# Patient Record
Sex: FEMALE | Race: AMERICAN INDIAN OR ALASKA NATIVE
[De-identification: names, ages, dates, MRNs, and addresses within clinical notes are randomized per-mention and may not be internally consistent; named-entity substitution may affect disease eponyms.]

---

## 2021-05-03 ENCOUNTER — HOSPITAL ENCOUNTER (EMERGENCY)
Dept: HOSPITAL 5 - ED | Age: 56
LOS: 1 days | Discharge: ANOTHER HOSPITAL | End: 2021-05-04
Payer: COMMERCIAL

## 2021-05-03 VITALS — DIASTOLIC BLOOD PRESSURE: 73 MMHG | SYSTOLIC BLOOD PRESSURE: 154 MMHG

## 2021-05-03 DIAGNOSIS — Z20.822: ICD-10-CM

## 2021-05-03 DIAGNOSIS — H53.9: ICD-10-CM

## 2021-05-03 DIAGNOSIS — E87.3: Primary | ICD-10-CM

## 2021-05-03 DIAGNOSIS — R94.31: ICD-10-CM

## 2021-05-03 DIAGNOSIS — Z88.8: ICD-10-CM

## 2021-05-03 DIAGNOSIS — E66.9: ICD-10-CM

## 2021-05-03 LAB
ALBUMIN SERPL-MCNC: 4.4 G/DL (ref 3.9–5)
ALT SERPL-CCNC: 14 UNITS/L (ref 7–56)
APTT BLD: 27.4 SEC. (ref 24.2–36.6)
BACTERIA #/AREA URNS HPF: (no result) /HPF
BASOPHILS # (AUTO): 0 K/MM3 (ref 0–0.1)
BASOPHILS NFR BLD AUTO: 0.4 % (ref 0–1.8)
BILIRUB UR QL STRIP: (no result)
BLOOD UR QL VISUAL: (no result)
BUN SERPL-MCNC: 20 MG/DL (ref 7–17)
BUN/CREAT SERPL: 20 %
CALCIUM SERPL-MCNC: 9.9 MG/DL (ref 8.4–10.2)
CRP SERPL-MCNC: 0.1 MG/DL (ref 0–1.3)
EOSINOPHIL # BLD AUTO: 0.1 K/MM3 (ref 0–0.4)
EOSINOPHIL NFR BLD AUTO: 0.8 % (ref 0–4.3)
HCO3 BLDA-SCNC: 19.6 MMOL/L (ref 20–26)
HCT VFR BLD CALC: 34.6 % (ref 30.3–42.9)
HEMOLYSIS INDEX: 16
HGB BLD-MCNC: 12.5 GM/DL (ref 10.1–14.3)
INR PPP: 1.07 (ref 0.87–1.13)
LYMPHOCYTES # BLD AUTO: 3.5 K/MM3 (ref 1.2–5.4)
LYMPHOCYTES NFR BLD AUTO: 43.1 % (ref 13.4–35)
MCHC RBC AUTO-ENTMCNC: 36 % (ref 30–34)
MCV RBC AUTO: 112 FL (ref 79–97)
MONOCYTES # (AUTO): 0.5 K/MM3 (ref 0–0.8)
MONOCYTES % (AUTO): 6.2 % (ref 0–7.3)
MUCOUS THREADS #/AREA URNS HPF: (no result) /HPF
PCO2 BLDA: 21.3 MM HG
PH BLDA: 7.58 PH UNITS (ref 7.35–7.45)
PH UR STRIP: 7 [PH] (ref 5–7)
PLATELET # BLD: 290 K/MM3 (ref 140–440)
PO2 BLDA: 138.2 MM HG (ref 80–90)
PROT UR STRIP-MCNC: (no result) MG/DL
RBC # BLD AUTO: 3.1 M/MM3 (ref 3.65–5.03)
RBC #/AREA URNS HPF: 2 /HPF (ref 0–6)
UROBILINOGEN UR-MCNC: 2 MG/DL (ref ?–2)
WBC #/AREA URNS HPF: 1 /HPF (ref 0–6)

## 2021-05-03 PROCEDURE — 96374 THER/PROPH/DIAG INJ IV PUSH: CPT

## 2021-05-03 PROCEDURE — 71275 CT ANGIOGRAPHY CHEST: CPT

## 2021-05-03 PROCEDURE — 87040 BLOOD CULTURE FOR BACTERIA: CPT

## 2021-05-03 PROCEDURE — 80053 COMPREHEN METABOLIC PANEL: CPT

## 2021-05-03 PROCEDURE — 96375 TX/PRO/DX INJ NEW DRUG ADDON: CPT

## 2021-05-03 PROCEDURE — 84484 ASSAY OF TROPONIN QUANT: CPT

## 2021-05-03 PROCEDURE — 82550 ASSAY OF CK (CPK): CPT

## 2021-05-03 PROCEDURE — 85670 THROMBIN TIME PLASMA: CPT

## 2021-05-03 PROCEDURE — 84145 PROCALCITONIN (PCT): CPT

## 2021-05-03 PROCEDURE — 83615 LACTATE (LD) (LDH) ENZYME: CPT

## 2021-05-03 PROCEDURE — 36415 COLL VENOUS BLD VENIPUNCTURE: CPT

## 2021-05-03 PROCEDURE — 80320 DRUG SCREEN QUANTALCOHOLS: CPT

## 2021-05-03 PROCEDURE — 82140 ASSAY OF AMMONIA: CPT

## 2021-05-03 PROCEDURE — 83880 ASSAY OF NATRIURETIC PEPTIDE: CPT

## 2021-05-03 PROCEDURE — 70450 CT HEAD/BRAIN W/O DYE: CPT

## 2021-05-03 PROCEDURE — 82728 ASSAY OF FERRITIN: CPT

## 2021-05-03 PROCEDURE — 71045 X-RAY EXAM CHEST 1 VIEW: CPT

## 2021-05-03 PROCEDURE — 85025 COMPLETE CBC W/AUTO DIFF WBC: CPT

## 2021-05-03 PROCEDURE — 93005 ELECTROCARDIOGRAM TRACING: CPT

## 2021-05-03 PROCEDURE — 82553 CREATINE MB FRACTION: CPT

## 2021-05-03 PROCEDURE — 81001 URINALYSIS AUTO W/SCOPE: CPT

## 2021-05-03 PROCEDURE — 85730 THROMBOPLASTIN TIME PARTIAL: CPT

## 2021-05-03 PROCEDURE — G0480 DRUG TEST DEF 1-7 CLASSES: HCPCS

## 2021-05-03 PROCEDURE — 82805 BLOOD GASES W/O2 SATURATION: CPT

## 2021-05-03 PROCEDURE — 85610 PROTHROMBIN TIME: CPT

## 2021-05-03 PROCEDURE — 83735 ASSAY OF MAGNESIUM: CPT

## 2021-05-03 PROCEDURE — 85379 FIBRIN DEGRADATION QUANT: CPT

## 2021-05-03 PROCEDURE — 86140 C-REACTIVE PROTEIN: CPT

## 2021-05-03 PROCEDURE — 82803 BLOOD GASES ANY COMBINATION: CPT

## 2021-05-03 PROCEDURE — 99285 EMERGENCY DEPT VISIT HI MDM: CPT

## 2021-05-03 NOTE — CAT SCAN REPORT
CTA CHEST WITH CONTRAST



INDICATION / CLINICAL INFORMATION: MAIN.



TECHNIQUE: Axial CT images were obtained through the chest after injection of 100 cc of Omnipaque 350
 IV contrast. 3 plane MIP and/or 3D reconstructions were produced. All CT scans at this location are 
performed using CT dose reduction for ALARA by means of automated exposure control. 



COMPARISON: None available.



FINDINGS:

PULMONARY ARTERIES: No pulmonary emboli.

THORACIC AORTA: No significant abnormality. 

HEART: No significant abnormality.

CORONARY ARTERY CALCIFICATION: None.

MEDIASTINUM / ESTHELA: No significant abnormality.

PLEURA: No pleural effusion. No pneumothorax.

LUNGS: No acute air space or interstitial disease. 



ADDITIONAL FINDINGS: None.



UPPER ABDOMEN: There is cholelithiasis.



SKELETAL STRUCTURES: No significant osseous abnormality.



IMPRESSION:

1. No CT evidence for pulmonary embolism. 

2. Cholelithiasis



Signer Name: Johnny Bailey MD 

Signed: 5/3/2021 3:01 PM

Workstation Name: [email or web address]

## 2021-05-03 NOTE — CAT SCAN REPORT
CT head/brain wo con



INDICATION:

Weakness, blurry vision.



TECHNIQUE: Routine CT head without contrast. All CT scans at this location are performed using CT dos
e reduction for ALARA by means of automated exposure control.



COMPARISON: 

None.



FINDINGS:



BRAIN / INTRACRANIAL CONTENTS: No acute hemorrhage, mass effect, midline shift, or hydrocephalus. No 
appreciable acute large territorial or lacunar infarct. No chronic infarct or focal atrophy. Normal b
rain volume and ventricular/sulcal size for age.



ORBITS: No significant abnormality of visualized orbits.

SINUSES / MASTOIDS: No significant abnormality of visualized sinuses and mastoid air cells.



ADDITIONAL FINDINGS: None. 



IMPRESSION:

1. No acute intracranial abnormality. 



Signer Name: Alejandro Tate MD 

Signed: 5/3/2021 2:50 PM

Workstation Name: [email or web address]

## 2021-05-03 NOTE — XRAY REPORT
CHEST 1 VIEW 5/3/2021 11:48 AM



INDICATION / CLINICAL INFORMATION: Shortness of breath and weakness.



COMPARISON: None available.



FINDINGS:



SUPPORT DEVICES: None.



HEART / MEDIASTINUM: No significant abnormality. 



LUNGS / PLEURA: No significant pulmonary or pleural abnormality. No pneumothorax. 



ADDITIONAL FINDINGS: No significant additional findings.



IMPRESSION:

1. No acute findings.



Signer Name: Alejandro Tate MD 

Signed: 5/3/2021 1:01 PM

Workstation Name: [email or web address]

## 2021-05-03 NOTE — EMERGENCY DEPARTMENT REPORT
ED General Adult HPI





- General


Chief complaint: Weakness


Stated complaint: weak


PUI?: Yes


Time Seen by Provider: 21 11:37


Source: patient, EMS (Verbal report received from emergency medical services.  

EMS documentation not available at time of chart dictation ), RN notes reviewed


Mode of arrival: Stretcher


Limitations: No Limitations





- History of Present Illness


Initial comments: 





The patient was evaluated in the emergency department for symptoms described in 

the history of present illness.  He/she was evaluated in the context of the 

global COVID-19 pandemic, which necessitated consideration that the patient 

might be at risk for infection with the virus that causes COVID-19.  

Institutional protocols and algorithms that pertain to the evaluation of 

patients at risk for COVID-19 are in a state of rapid change based on 

information released by regulatory bodies including the CDC and federal and 

state organizations.  These policies and algorithms were followed during the 

patient's care in the emergency department.  Please note that these policies, 

procedures and recommendations changed on a rapid basis.





Primary CARE doctor: Nora





Past medical history: Hypertension, obesity, obstructive sleep apnea, noncompli

ant with CPAP, not currently on home oxygen, reports having had Covid 

vaccination x2, questionable normal pressure hydrocephalus.





During entire history and physical examination, I had on complete personal 

protective equipment.





This is a 55-year-old female.  She is not known to myself previously.  She is 

brought to the hospital by emergency medical services.  The patient works in a 

local cafeteria.  The patient reports that her coworkers contacted 911 "because 

I looked weak."  The patient complains of generalized weakness.  She denies 

physical pain.  She states she has developed shortness of breath since walking 

out to the EMS ambulance.





The patient denies headache, neck pain, chest pain, abdominal pain, hematemesis,

bright red blood per rectum.  She reports that she had "blurry vision", for "a 

few seconds", at around 7:00 AM, "when all of my symptoms started."  However, 

she believes that her vision is back to baseline.





The patient denies loss of taste and smell.  She denies urinary symptoms.  She 

denies hematemesis and bright red blood per rectum.





The patient states she feels generally weak, and short of breath.  She denies 

DVT and pulmonary embolism risk factors.








-: Gradual


Consistency: constant


Improves with: rest


Worsens with: movement





- Related Data


                                    Allergies











Allergy/AdvReac Type Severity Reaction Status Date / Time


 


acetaminophen [From Midrin] Allergy Severe Hives Verified 21 12:45


 


dichloralphenazone Allergy Severe Hives Verified 21 12:45





[From Midrin]     


 


isometheptene [From Midrin] Allergy Severe Hives Verified 21 12:45














ED Review of Systems


ROS: 


Stated complaint: POSSIBLE STROKE


Other details as noted in HPI





Constitutional: malaise, weakness.  denies: fever


Eyes: vision change.  denies: eye discharge


ENT: denies: epistaxis


Respiratory: shortness of breath


Cardiovascular: denies: chest pain


Gastrointestinal: denies: abdominal pain, hematemesis, melena, hematochezia


Genitourinary: denies: dysuria


Musculoskeletal: denies: back pain


Neurological: weakness.  denies: headache


Hematological/Lymphatic: denies: easy bleeding





ED Physical Exam





- General


Limitations: No Limitations


General appearance: alert, in no apparent distress, obese





- Head


Head exam: Present: atraumatic, normocephalic





- Eye


Eye exam: Present: normal appearance, PERRL, EOMI, other (Visual acuity intact 

to finger counting, color perception, reading at a close distance).  Absent: 

nystagmus





- ENT


ENT exam: Present: normal exam, normal orophraynx, mucous membranes moist, 

normal external ear exam





- Neck


Neck exam: Present: normal inspection, full ROM.  Absent: tenderness, menin

gismus





- Respiratory


Respiratory exam: Present: other (Pulmonary auscultation not performed secondary

to lack of disposable stethoscope, and PPE conservation strategy).  Absent: 

stridor





- Cardiovascular


Cardiovascular Exam: Present: other (Cardiac auscultation not performed 

secondary to lack of disposable stethoscope, and PPE conservation strategy)





- GI/Abdominal


GI/Abdominal exam: Present: soft.  Absent: distended, tenderness, guarding, 

rebound, rigid, pulsatile mass





- Extremities Exam


Extremities exam: Present: normal inspection, full ROM, other (2+ pulses noted 

in the bilateral upper and lower extremities.  There is no palpable cord.   

negative Homans sign.  Muscular compartments are soft.  The pelvis is stable.). 

Absent: pedal edema, calf tenderness





- Back Exam


Back exam: Present: normal inspection, full ROM.  Absent: tenderness, CVA 

tenderness (R), CVA tenderness (L), paraspinal tenderness, vertebral tenderness





- Neurological Exam


Neurological exam: Present: alert, oriented X3, other (No facial droop.  Tongue 

midline.  Extraocular movements intact bilaterally.  Facial sensation intact to 

light touch in V1, V2, V3 distribution bilaterally.  5 and a 5 strength in 4 

extremities.  Sensation intact to light touch in 4 extremities.)





- Psychiatric


Psychiatric exam: Present: anxious





- Skin


Skin exam: Present: warm, dry, intact, normal color.  Absent: rash





ED Course


                                   Vital Signs











  21





  11:35 12:27 12:30


 


Temperature 98.2 F  


 


Pulse Rate 71 72 72


 


Respiratory 13 14 10 L





Rate   


 


Blood Pressure 156/89  156/89


 


Blood Pressure   





[Left]   


 


O2 Sat by Pulse 100 100 100





Oximetry   














  21





  12:45 13:01 13:31


 


Temperature   


 


Pulse Rate 74 80 72


 


Respiratory 15 12 12





Rate   


 


Blood Pressure 156/89 156/89 156/89


 


Blood Pressure   





[Left]   


 


O2 Sat by Pulse 100 100 100





Oximetry   














  21





  14:16 14:32 16:01


 


Temperature   


 


Pulse Rate   74


 


Respiratory  12 11 L





Rate   


 


Blood Pressure 156/89  151/93


 


Blood Pressure  151/93 





[Left]   


 


O2 Sat by Pulse 100 100 





Oximetry   














  21





  16:56 17:00


 


Temperature  


 


Pulse Rate  64


 


Respiratory  10 L





Rate  


 


Blood Pressure 151/93 165/95


 


Blood Pressure  





[Left]  


 


O2 Sat by Pulse 100 100





Oximetry  














- Reevaluation(s)


Reevaluation #1: 





21 12:01


Differential diagnosis, including but not limited to: Hypoxemia, hypercarbia, 

pneumonia, pulmonary embolism, COVID-19, normal pressure hydrocephalus, in

tracranial lesion, acute coronary syndrome, electrolyte derangement, dehydration





Assessment and plan: 55-year-old female with a complaint of generalized 

weakness, transient visual change, not sure if monocular or binocular, but 

subjectively feels that vision back to baseline, without chest pain, DVT or 

pulmonary embolism risk factors, who reports noncompliance with CPAP, having had

 2 COVID-19 vaccinations, generalized malaise and fatigue, without headache or 

neck pain.





Place patient on isolation.  Obtain CT scan of the brain, x-ray of the chest, 

plus minus CT angiogram of the chest, depending on D-dimer, and x-ray results.  

Obtain arterial blood gas on room air, administer supplemental oxygen if 

necessary, and appropriate laboratory studies.





EKG abnormal without prior for comparison, but not consistent with STEMI.  

Reassess after initial data points.  Anticipate admission.  Have discussed this 

plan of care with the patient, who verbalized understanding.








Reevaluation #2: 





21 14:57


Please note that fever, tachycardia, hypotension documented by nursing team in 

error.  As per nursing report, this patient has not spiked a fever or become 

tachycardic.


21 16:30


Have discussed the case with [facility] physician, Dr. Trammell, with abnormal EKG, 

nonspecific symptoms, moderate risk for major adverse cardiac event as per heart

 score, we would recommend admission for diagnostic evaluation, and supportive 

care.





Nursing team informed me that the patient ambulated with a steady gait, but was 

very short of breath while ambulating.





Arterial blood gas demonstrates an essentially uncompensated respiratory 

alkalosis.


21 17:55


Awaiting callback and bed assignment from [facility].  Upon our last discussion, 

they informed us that they are currently waiting to hear back from [facility] 

regarding bed availability.


Reevaluation #3: 





21 18:57


Called [facility] with request for update.  Have discussed with their physician 

coordinator, Dr Burroughs.  She advises me that it is unlikely that [facility] 

has any beds and would not be able to accommodate with an admission.  She 

believes at [facility] may have beds, and she is waiting to hear back 

from them definitively.





Have discussed this patient's life of stable here in the emergency room.


Reevaluation #4: 





21 19:33


Patient updated on plan of care.  She is amenable to this plan of care.  We are 

awaiting bed assignment from [facility].  She is asking for headache medicine,

 anxiety medicine, and is asking to eat.  Orders initiated.  Patient may eat.





Care be transferred to the oncoming ER physician, to follow-up on [facility] bed ass

ignment.





ED Medical Decision Making





- Lab Data


Result diagrams: 


                                 21 12:30





                                 21 12:30








                                   Lab Results











  21 Range/Units





  12:15 12:30 12:30 


 


WBC    8.1  (4.5-11.0)  K/mm3


 


RBC    3.10 L  (3.65-5.03)  M/mm3


 


Hgb    12.5  (10.1-14.3)  gm/dl


 


Hct    34.6  (30.3-42.9)  %


 


MCV    112 H  (79-97)  fl


 


MCH    40 H  (28-32)  pg


 


MCHC    36 H  (30-34)  %


 


RDW    13.9  (13.2-15.2)  %


 


Plt Count    290  (140-440)  K/mm3


 


Lymph % (Auto)    43.1 H  (13.4-35.0)  %


 


Mono % (Auto)    6.2  (0.0-7.3)  %


 


Eos % (Auto)    0.8  (0.0-4.3)  %


 


Baso % (Auto)    0.4  (0.0-1.8)  %


 


Lymph # (Auto)    3.5  (1.2-5.4)  K/mm3


 


Mono # (Auto)    0.5  (0.0-0.8)  K/mm3


 


Eos # (Auto)    0.1  (0.0-0.4)  K/mm3


 


Baso # (Auto)    0.0  (0.0-0.1)  K/mm3


 


Seg Neutrophils %    49.5  (40.0-70.0)  %


 


Seg Neutrophils #    4.0  (1.8-7.7)  K/mm3


 


PT     (12.2-14.9)  Sec.


 


INR     (0.87-1.13)  


 


APTT     (24.2-36.6)  Sec.


 


Thrombin Time     (15.1-19.6)  Sec.


 


D-Dimer     (0-234)  ng/mlDDU


 


ABG pH  7.613 H    (7.320-7.450)  


 


POC ABG pCO2  20.2 L    (32.0-48.0)  mmHg


 


ABG pCO2     mm Hg


 


ABG pO2     (80.0-90.0)  mm Hg


 


POC ABG HCO3  20    


 


ABG HCO3     (20.0-26.0)  mmol/L


 


ABG O2 Saturation  98.9    (0-100)  


 


ABG O2 Content     (0.0-44)  


 


POC ABG Base Excess  0.6    


 


ABG Base Excess     (-2.0-3.0)  mmol/L


 


ABG Hemoglobin  12.7    (12.0-17.5)  


 


ABG Oxyhemoglobin  98.1 H    (94-98)  


 


ABG Carboxyhemoglobin     (0.0-5.0)  %


 


ABG Methemoglobin  0.3    (0.0-1.5)  


 


ABG Sodium  137.5    (136.0-145.0)  mmol/L


 


ABG Potassium  3.3 L    (3.40-4.50)  mmol/L


 


ABG Chloride  103.0    ()  mmol/L


 


ABG Glucose  105 H    (65-95)  mg/dL


 


Oxyhemoglobin     (95.0-99.0)  %


 


Carboxyhemoglobin  0.5    (0.5-1.5)  


 


FiO2     %


 


FiO2 %  21    


 


Lactic Acid     (0.7-2.0)  mmol/L


 


CK-MB (CK-2)     (0.0-4.0)  ng/mL


 


NT-Pro-B Natriuret Pep     (0-900)  pg/mL


 


Arterial Blood Glucose  105 H    (65-95)  mg/dL


 


Arterial Blood Ionized Calcium  4.7    (4.6-5.3)  mg/dL


 


Urine Color   Yellow   (Yellow)  


 


Urine Turbidity   Clear   (Clear)  


 


Urine pH   7.0   (5.0-7.0)  


 


Ur Specific Gravity   1.006   (1.003-1.030)  


 


Urine Protein   <15 mg/dl   (Negative)  mg/dL


 


Urine Glucose (UA)   Neg   (Negative)  mg/dL


 


Urine Ketones   20   (Negative)  mg/dL


 


Urine Blood   Neg   (Negative)  


 


Urine Nitrite   Neg   (Negative)  


 


Urine Bilirubin   Neg   (Negative)  


 


Urine Urobilinogen   2.0   (<2.0)  mg/dL


 


Ur Leukocyte Esterase   Neg   (Negative)  


 


Urine WBC (Auto)   1.0   (0.0-6.0)  /HPF


 


Urine RBC (Auto)   2.0   (0.0-6.0)  /HPF


 


U Epithel Cells (Auto)   1.0   (0-13.0)  /HPF


 


Urine Bacteria (Auto)   1+   (Negative)  /HPF


 


Urine Mucus   Few   /HPF


 


Plasma/Serum Alcohol     (0-0.07)  %














  21 Range/Units





  12:30 12:30 12:30 


 


WBC     (4.5-11.0)  K/mm3


 


RBC     (3.65-5.03)  M/mm3


 


Hgb     (10.1-14.3)  gm/dl


 


Hct     (30.3-42.9)  %


 


MCV     (79-97)  fl


 


MCH     (28-32)  pg


 


MCHC     (30-34)  %


 


RDW     (13.2-15.2)  %


 


Plt Count     (140-440)  K/mm3


 


Lymph % (Auto)     (13.4-35.0)  %


 


Mono % (Auto)     (0.0-7.3)  %


 


Eos % (Auto)     (0.0-4.3)  %


 


Baso % (Auto)     (0.0-1.8)  %


 


Lymph # (Auto)     (1.2-5.4)  K/mm3


 


Mono # (Auto)     (0.0-0.8)  K/mm3


 


Eos # (Auto)     (0.0-0.4)  K/mm3


 


Baso # (Auto)     (0.0-0.1)  K/mm3


 


Seg Neutrophils %     (40.0-70.0)  %


 


Seg Neutrophils #     (1.8-7.7)  K/mm3


 


PT  13.8    (12.2-14.9)  Sec.


 


INR  1.07    (0.87-1.13)  


 


APTT  27.4    (24.2-36.6)  Sec.


 


Thrombin Time  16.9    (15.1-19.6)  Sec.


 


D-Dimer  < 135.00    (0-234)  ng/mlDDU


 


ABG pH     (7.320-7.450)  


 


POC ABG pCO2     (32.0-48.0)  mmHg


 


ABG pCO2     mm Hg


 


ABG pO2     (80.0-90.0)  mm Hg


 


POC ABG HCO3     


 


ABG HCO3     (20.0-26.0)  mmol/L


 


ABG O2 Saturation     (0-100)  


 


ABG O2 Content     (0.0-44)  


 


POC ABG Base Excess     


 


ABG Base Excess     (-2.0-3.0)  mmol/L


 


ABG Hemoglobin     (12.0-17.5)  


 


ABG Oxyhemoglobin     (94-98)  


 


ABG Carboxyhemoglobin     (0.0-5.0)  %


 


ABG Methemoglobin     (0.0-1.5)  


 


ABG Sodium     (136.0-145.0)  mmol/L


 


ABG Potassium     (3.40-4.50)  mmol/L


 


ABG Chloride     ()  mmol/L


 


ABG Glucose     (65-95)  mg/dL


 


Oxyhemoglobin     (95.0-99.0)  %


 


Carboxyhemoglobin     (0.5-1.5)  


 


FiO2     %


 


FiO2 %     


 


Lactic Acid    2.40 H*  (0.7-2.0)  mmol/L


 


CK-MB (CK-2)   1.5   (0.0-4.0)  ng/mL


 


NT-Pro-B Natriuret Pep     (0-900)  pg/mL


 


Arterial Blood Glucose     (65-95)  mg/dL


 


Arterial Blood Ionized Calcium     (4.6-5.3)  mg/dL


 


Urine Color     (Yellow)  


 


Urine Turbidity     (Clear)  


 


Urine pH     (5.0-7.0)  


 


Ur Specific Gravity     (1.003-1.030)  


 


Urine Protein     (Negative)  mg/dL


 


Urine Glucose (UA)     (Negative)  mg/dL


 


Urine Ketones     (Negative)  mg/dL


 


Urine Blood     (Negative)  


 


Urine Nitrite     (Negative)  


 


Urine Bilirubin     (Negative)  


 


Urine Urobilinogen     (<2.0)  mg/dL


 


Ur Leukocyte Esterase     (Negative)  


 


Urine WBC (Auto)     (0.0-6.0)  /HPF


 


Urine RBC (Auto)     (0.0-6.0)  /HPF


 


U Epithel Cells (Auto)     (0-13.0)  /HPF


 


Urine Bacteria (Auto)     (Negative)  /HPF


 


Urine Mucus     /HPF


 


Plasma/Serum Alcohol     (0-0.07)  %














  21 Range/Units





  12:30 12:30 12:30 


 


WBC     (4.5-11.0)  K/mm3


 


RBC     (3.65-5.03)  M/mm3


 


Hgb     (10.1-14.3)  gm/dl


 


Hct     (30.3-42.9)  %


 


MCV     (79-97)  fl


 


MCH     (28-32)  pg


 


MCHC     (30-34)  %


 


RDW     (13.2-15.2)  %


 


Plt Count     (140-440)  K/mm3


 


Lymph % (Auto)     (13.4-35.0)  %


 


Mono % (Auto)     (0.0-7.3)  %


 


Eos % (Auto)     (0.0-4.3)  %


 


Baso % (Auto)     (0.0-1.8)  %


 


Lymph # (Auto)     (1.2-5.4)  K/mm3


 


Mono # (Auto)     (0.0-0.8)  K/mm3


 


Eos # (Auto)     (0.0-0.4)  K/mm3


 


Baso # (Auto)     (0.0-0.1)  K/mm3


 


Seg Neutrophils %     (40.0-70.0)  %


 


Seg Neutrophils #     (1.8-7.7)  K/mm3


 


PT     (12.2-14.9)  Sec.


 


INR     (0.87-1.13)  


 


APTT     (24.2-36.6)  Sec.


 


Thrombin Time     (15.1-19.6)  Sec.


 


D-Dimer     (0-234)  ng/mlDDU


 


ABG pH    7.581 H  (7.320-7.450)  


 


POC ABG pCO2     (32.0-48.0)  mmHg


 


ABG pCO2    21.3  mm Hg


 


ABG pO2    138.2 H  (80.0-90.0)  mm Hg


 


POC ABG HCO3     


 


ABG HCO3    19.6 L  (20.0-26.0)  mmol/L


 


ABG O2 Saturation    99.0  (0-100)  


 


ABG O2 Content    17.2  (0.0-44)  


 


POC ABG Base Excess     


 


ABG Base Excess    -0.5  (-2.0-3.0)  mmol/L


 


ABG Hemoglobin    12.4  (12.0-17.5)  


 


ABG Oxyhemoglobin     (94-98)  


 


ABG Carboxyhemoglobin    1.2  (0.0-5.0)  %


 


ABG Methemoglobin    0.4  (0.0-1.5)  


 


ABG Sodium     (136.0-145.0)  mmol/L


 


ABG Potassium     (3.40-4.50)  mmol/L


 


ABG Chloride     ()  mmol/L


 


ABG Glucose     (65-95)  mg/dL


 


Oxyhemoglobin    97.4  (95.0-99.0)  %


 


Carboxyhemoglobin     (0.5-1.5)  


 


FiO2    21  %


 


FiO2 %     


 


Lactic Acid     (0.7-2.0)  mmol/L


 


CK-MB (CK-2)     (0.0-4.0)  ng/mL


 


NT-Pro-B Natriuret Pep   75.31   (0-900)  pg/mL


 


Arterial Blood Glucose     (65-95)  mg/dL


 


Arterial Blood Ionized Calcium     (4.6-5.3)  mg/dL


 


Urine Color     (Yellow)  


 


Urine Turbidity     (Clear)  


 


Urine pH     (5.0-7.0)  


 


Ur Specific Gravity     (1.003-1.030)  


 


Urine Protein     (Negative)  mg/dL


 


Urine Glucose (UA)     (Negative)  mg/dL


 


Urine Ketones     (Negative)  mg/dL


 


Urine Blood     (Negative)  


 


Urine Nitrite     (Negative)  


 


Urine Bilirubin     (Negative)  


 


Urine Urobilinogen     (<2.0)  mg/dL


 


Ur Leukocyte Esterase     (Negative)  


 


Urine WBC (Auto)     (0.0-6.0)  /HPF


 


Urine RBC (Auto)     (0.0-6.0)  /HPF


 


U Epithel Cells (Auto)     (0-13.0)  /HPF


 


Urine Bacteria (Auto)     (Negative)  /HPF


 


Urine Mucus     /HPF


 


Plasma/Serum Alcohol  < 0.01    (0-0.07)  %











                                   Vital Signs











  21





  11:35 12:27 12:30


 


Temperature 98.2 F  


 


Pulse Rate 71 72 72


 


Respiratory 13 14 10 L





Rate   


 


Blood Pressure 156/89  156/89


 


O2 Sat by Pulse 100 100 100





Oximetry   














  21





  12:45


 


Temperature 


 


Pulse Rate 74


 


Respiratory 15





Rate 


 


Blood Pressure 156/89


 


O2 Sat by Pulse 100





Oximetry 














- EKG Data


-: EKG Interpreted by Me


EKG shows normal: sinus rhythm


Rate: normal





- EKG Data


When compared to previous EKG there are: previous EKG unavailable





21 12:03


EKG interpreted at 11: 35





Sinus rhythm, 76 bpm.  Normal axis, left ventricular hypertrophy, QTC prolonged,

 poor R wave progression.  This is an abnormal EKG.  This is not a STEMI.  There

 is no prior EKG available for comparison.











- Radiology Data


Radiology results: pending, report reviewed, image reviewed





[facility] [address] [place] [address] 

XRay Report Signed Patient: THIAGO MOSQUEDA MR#: [id number] 6 : 1965 

Acct:[id number] Age/Sex: 55 / F ADM Date: 21 Loc: ED Attending Dr: 

Ordering Physician: MONIE BRAND MD Date of Service: 21 Procedure(s):

 XR chest 1V ap Accession Number(s): [id number] cc: MONIE BRAND MD Fluoro 

Time In Minutes: CHEST 1 VIEW 5/3/2021 11:48 AM INDICATION / CLINICAL 

INFORMATION: Shortness of breath and weakness. COMPARISON: None available. 

FINDINGS: SUPPORT DEVICES: None. HEART / MEDIASTINUM: No significant 

abnormality. LUNGS / PLEURA: No significant pulmonary or pleural abnormality. No

 pneumothorax. ADDITIONAL FINDINGS: No significant additional findings. 

IMPRESSION: 1. No acute findings. Signer Name: Alejandro Tate MD Signed: 5/3/2021 

1:01 PM Workstation Name: [facility]"[employer]"[place] [id number] Transcribed By: DAVID Dictated By: Alejandro Tate MD Electronically Authenticated By: Alejandro Tate MD Signed 

Date/Time: 21 130 DD/DT: 21 1301 








CT head/brain wo con  INDICATION: Weakness, blurry vision.  TECHNIQUE: Routine 

CT head without contrast. All CT scans at this location are performed using CT 

dose reduction for ALARA by means of automated exposure control.  COMPARISON: 

None.  FINDINGS:  BRAIN / INTRACRANIAL CONTENTS: No acute hemorrhage, mass 

effect, midline shift, or hydrocephalus. No appreciable acute large territorial 

or lacunar infarct. No chronic infarct or focal atrophy. Normal brain volume and

 ventricular/sulcal size for age.  ORBITS: No significant abnormality of 

visualized orbits. SINUSES / MASTOIDS: No significant abnormality of visualized 

sinuses and mastoid air cells.  ADDITIONAL FINDINGS: None.  








IMPRESSION: 1. No acute intracranial abnormality.  Signer Name: Alejandro Tate MD 

Signed: 5/3/2021 1:50 PM Workstation Name: [facility] [address] [place] [id number] Cat

 Scan Report Signed Patient: THIAGO MOSQUEDA MR#: [id number] 6 : 1965 

Acct:[id number] Age/Sex: 55 / F ADM Date: 21 Loc: ED Attending Dr: 

Ordering Physician: MONIE BRAND MD Date of Service: 21 Procedure(s):

 CT angio chest Accession Number(s): [id number] cc: MONIE BRAND MD CTA CHEST 

WITH CONTRAST INDICATION / CLINICAL INFORMATION: MAIN. TECHNIQUE: Axial CT 

images were obtained through the chest after injection of 100 cc of Omnipaque 

350 IV contrast. 3 plane MIP and/or 3D reconstructions were produced. All CT 

scans at this location are performed using CT dose reduction for ALARA by means 

of automated exposure control. COMPARISON: None available. FINDINGS: PULMONARY 

ARTERIES: No pulmonary emboli. THORACIC AORTA: No significant abnormality. 

HEART: No significant abnormality. CORONARY ARTERY CALCIFICATION: None. 

MEDIASTINUM / ESTHELA: No significant abnormality. PLEURA: No pleural effusion. No 

pneumothorax. LUNGS: No acute air space or interstitial disease. ADDITIONAL 

FINDINGS: None. UPPER ABDOMEN: There is cholelithiasis. SKELETAL STRUCTURES: No 

significant osseous abnormality. 





IMPRESSION: 1. No CT evidence for pulmonary embolism. 2. Cholelithiasis Signer 

Name: Johnny Bailey MD Signed: 5/3/2021 3:01 PM Workstation Name: [id number] 

Transcribed By: SS Dictated By: Johnny Bailey MD Electronically 

Authenticated By: Johnny Bailey MD Signed Date/Time: 21 1501 DD/DT: 

21 1456 


Critical care attestation.: 


If time is entered above; I have spent that time in minutes in the direct care 

of this critically ill patient, excluding procedure time.








ED Disposition


Clinical Impression: 


 Suspected 2019 novel coronavirus infection, Respiratory alkalosis, Obesity (BMI

 30-39.9), Shortness of breath, Abnormal EKG, Visual disturbance





[facility]


Is pt being admited?: No


Does the pt Need Aspirin: No


Condition: Good


Referrals: 


JEMAL,NORA [Other] - 3-5 Days





Heart Score





- HEART Score


History: Slightly suspicious


EKG: Non-specific


Age: 45-65


Risk factors: > 3 risk factors or hx of atherosclerotic disease


Troponin: < normal limit


HEART Score: 4





- EKG Read Time


Time EKG Completed: 11:35


EKG Read Time: 11:35





- Critical Actions


Critical Actions: 4-6 pts:12-16.6% risk of adverse cardiac event. Should be 

admitted